# Patient Record
(demographics unavailable — no encounter records)

---

## 2025-06-12 NOTE — PLAN
[FreeTextEntry1] : 45 year old presents for annual visit.   HCM -pap today -rx breast mammo -colonoscopy utd -f/u PCP for annual and appropriate immunizations -vit D/exercise/calcium   RTO 1 year

## 2025-06-12 NOTE — HISTORY OF PRESENT ILLNESS
[FreeTextEntry1] : 45 year old presents for annual visit. She is doing well and has no complaints.   GYN: NVDx1,  x1, mab x1 MHX: Migraines MEDS: Amovig, Maxalt. Social:  had vasectomy      [TextBox_4] : 44 yo presents for annual visit. She is doing well and has no complaints.  GYN: NVDx1,  x1, mab x1 MHX: Migraines MEDS: Amovig, Maxalt. [Mammogramdate] : 01/25 [PapSmeardate] : 01/24 [ColonoscopyDate] : 02/25

## 2025-06-12 NOTE — END OF VISIT
[FreeTextEntry3] : I, Odalys Cui, acted as a scribe on behalf of Dr. Carla Coppola M.D. on 06/12/2025.   All medical entries made by the scribe were at my Dr. Carla Coppola M.D. direction and personally dictated by me on 06/12/2025. I have reviewed the chart and agree that the record accurately reflects my personal performance of the history, physical exam, assessment and plan. I have also personally directed, reviewed, and agreed with the chart.